# Patient Record
Sex: MALE | Race: WHITE | NOT HISPANIC OR LATINO
[De-identification: names, ages, dates, MRNs, and addresses within clinical notes are randomized per-mention and may not be internally consistent; named-entity substitution may affect disease eponyms.]

---

## 2021-08-30 ENCOUNTER — TRANSCRIPTION ENCOUNTER (OUTPATIENT)
Age: 56
End: 2021-08-30

## 2021-08-30 ENCOUNTER — FORM ENCOUNTER (OUTPATIENT)
Age: 56
End: 2021-08-30

## 2021-08-31 ENCOUNTER — TRANSCRIPTION ENCOUNTER (OUTPATIENT)
Age: 56
End: 2021-08-31

## 2021-09-02 ENCOUNTER — APPOINTMENT (OUTPATIENT)
Dept: DISASTER EMERGENCY | Facility: HOSPITAL | Age: 56
End: 2021-09-02

## 2021-09-02 ENCOUNTER — OUTPATIENT (OUTPATIENT)
Dept: INPATIENT UNIT | Facility: HOSPITAL | Age: 56
LOS: 1 days | End: 2021-09-02
Payer: COMMERCIAL

## 2021-09-02 VITALS
DIASTOLIC BLOOD PRESSURE: 86 MMHG | RESPIRATION RATE: 20 BRPM | HEART RATE: 64 BPM | TEMPERATURE: 98 F | OXYGEN SATURATION: 94 % | HEIGHT: 70 IN | WEIGHT: 309.97 LBS | SYSTOLIC BLOOD PRESSURE: 137 MMHG

## 2021-09-02 VITALS
HEART RATE: 57 BPM | TEMPERATURE: 98 F | OXYGEN SATURATION: 95 % | SYSTOLIC BLOOD PRESSURE: 110 MMHG | DIASTOLIC BLOOD PRESSURE: 68 MMHG | RESPIRATION RATE: 20 BRPM

## 2021-09-02 DIAGNOSIS — U07.1 COVID-19: ICD-10-CM

## 2021-09-02 PROCEDURE — M0243: CPT

## 2021-09-02 RX ORDER — SODIUM CHLORIDE 9 MG/ML
250 INJECTION INTRAMUSCULAR; INTRAVENOUS; SUBCUTANEOUS
Refills: 0 | Status: COMPLETED | OUTPATIENT
Start: 2021-09-02 | End: 2021-09-02

## 2021-09-02 RX ADMIN — SODIUM CHLORIDE 310 MILLILITER(S): 9 INJECTION INTRAMUSCULAR; INTRAVENOUS; SUBCUTANEOUS at 11:16

## 2021-09-02 NOTE — CHART NOTE - NSCHARTNOTEFT_GEN_A_CORE
CC: Monoclonal Antibody Infusion/COVID 19 Positive      History: Patient presents for infusion of monoclonal antibody infusion. Patient has been screened and was deemed to be a candidate.    Symptoms/ Criteria: Fever, cough, sore throat, body ache, nasal congestion, loss of smell    Inclusion Criteria: BMI> 25  Positive Test Date: 8/30/21  Date of Symptom Onset: 8/30/21        PMHx:  Infection due to severe acute respiratory syndrome coronavirus 2 (SARS-CoV-2)          T(C): 36.6 (09-02-21 @ 12:35), Max: 36.8 (09-02-21 @ 10:15)  HR: 57 (09-02-21 @ 12:35) (57 - 64)  BP: 110/68 (09-02-21 @ 12:35) (110/68 - 137/86)  RR: 20 (09-02-21 @ 12:35) (20 - 20)  SpO2: 95% (09-02-21 @ 12:35) (93% - 95%)      PE:   Appearance: NAD	  HEENT:   Normal oral mucosa.   Lymphatic: No lymphadenopathy  Cardiovascular: Normal S1 S2, No JVD, No murmurs, No edema  Respiratory: Lungs clear to auscultation	  Gastrointestinal:  Soft, Non-tender. No guarding   Skin: warm and dry  Neurologic: Non-focal  Extremities: Normal range of motion.     ASSESSMENT:  Pt is a 55y year old Male with PMH Covid +  referred by Ana Brewster to the infusion center for Monoclonal antibody infusion (Regeneron).        PLAN:  - infusion procedure explained to patient   - Consent for monoclonal antibody infusion obtained   - Risk & benefits discussed/all questions answered  - infuse Regeneron over twenty-one minutes   - will observe patient for one hour post infusion  and then if stable discharge home with oupt follow up as planned by PMD.    POST INFUSION ASSESSMENT:   DISCHARGE at approximately  12:40  hours    - Patient tolerated infusion well denies complaints of chest pain/SOB/dizziness/ palps  - VSS for discharge home  - D/C instructions given/ fact sheet included.  - Patient to follow-up with PCP as needed.

## 2021-09-04 ENCOUNTER — TRANSCRIPTION ENCOUNTER (OUTPATIENT)
Age: 56
End: 2021-09-04

## 2021-09-04 PROBLEM — Z00.00 ENCOUNTER FOR PREVENTIVE HEALTH EXAMINATION: Status: ACTIVE | Noted: 2021-09-04

## 2022-06-07 ENCOUNTER — APPOINTMENT (OUTPATIENT)
Dept: ORTHOPEDIC SURGERY | Facility: CLINIC | Age: 57
End: 2022-06-07
Payer: OTHER MISCELLANEOUS

## 2022-06-07 VITALS — HEIGHT: 69 IN | WEIGHT: 315 LBS | BODY MASS INDEX: 46.65 KG/M2

## 2022-06-07 DIAGNOSIS — M51.36 OTHER INTERVERTEBRAL DISC DEGENERATION, LUMBAR REGION: ICD-10-CM

## 2022-06-07 DIAGNOSIS — Z78.9 OTHER SPECIFIED HEALTH STATUS: ICD-10-CM

## 2022-06-07 DIAGNOSIS — E07.9 DISORDER OF THYROID, UNSPECIFIED: ICD-10-CM

## 2022-06-07 DIAGNOSIS — I10 ESSENTIAL (PRIMARY) HYPERTENSION: ICD-10-CM

## 2022-06-07 PROCEDURE — 99072 ADDL SUPL MATRL&STAF TM PHE: CPT

## 2022-06-07 PROCEDURE — 99214 OFFICE O/P EST MOD 30 MIN: CPT

## 2022-06-07 RX ORDER — LEVOTHYROXINE SODIUM 0.07 MG/1
75 TABLET ORAL
Refills: 0 | Status: ACTIVE | COMMUNITY

## 2022-06-07 RX ORDER — PANTOPRAZOLE 40 MG/1
40 TABLET, DELAYED RELEASE ORAL
Qty: 90 | Refills: 0 | Status: ACTIVE | COMMUNITY
Start: 2022-01-14

## 2022-06-07 RX ORDER — PANTOPRAZOLE SODIUM 40 MG/1
40 TABLET, DELAYED RELEASE ORAL
Refills: 0 | Status: ACTIVE | COMMUNITY

## 2022-06-07 RX ORDER — LEVOTHYROXINE SODIUM 0.07 MG/1
75 TABLET ORAL
Qty: 90 | Refills: 0 | Status: ACTIVE | COMMUNITY
Start: 2022-03-08

## 2022-06-07 RX ORDER — METOPROLOL SUCCINATE 25 MG/1
25 TABLET, EXTENDED RELEASE ORAL
Qty: 90 | Refills: 0 | Status: ACTIVE | COMMUNITY
Start: 2022-04-04

## 2022-06-07 RX ORDER — METOPROLOL SUCCINATE 25 MG/1
25 TABLET, EXTENDED RELEASE ORAL
Refills: 0 | Status: ACTIVE | COMMUNITY

## 2022-06-07 RX ORDER — GABAPENTIN 400 MG/1
400 CAPSULE ORAL
Qty: 90 | Refills: 0 | Status: ACTIVE | COMMUNITY
Start: 2022-03-08

## 2022-06-07 RX ORDER — NAPROXEN 500 MG/1
500 TABLET ORAL
Qty: 180 | Refills: 0 | Status: ACTIVE | COMMUNITY
Start: 2022-05-03

## 2022-06-07 RX ORDER — VALSARTAN AND HYDROCHLOROTHIAZIDE 160; 12.5 MG/1; MG/1
160-12.5 TABLET, FILM COATED ORAL
Refills: 0 | Status: ACTIVE | COMMUNITY

## 2022-06-07 RX ORDER — VALSARTAN AND HYDROCHLOROTHIAZIDE 160; 12.5 MG/1; MG/1
160-12.5 TABLET, FILM COATED ORAL
Qty: 90 | Refills: 0 | Status: ACTIVE | COMMUNITY
Start: 2022-01-14

## 2022-06-07 RX ORDER — AMOXICILLIN AND CLAVULANATE POTASSIUM 875; 125 MG/1; MG/1
875-125 TABLET, COATED ORAL
Qty: 20 | Refills: 0 | Status: ACTIVE | COMMUNITY
Start: 2022-03-31

## 2022-06-07 NOTE — ASSESSMENT
[FreeTextEntry1] : Patient requesting  Bilateral Orthovisc Injections  Patient is actively participating in  Weight loss program.

## 2022-06-07 NOTE — REASON FOR VISIT
[FreeTextEntry2] : HERE TODAY FOR BILATERAL KNEE PAIN, C/O RIGHT KNEE IS WORSE NOW.  HAS HAD MULTIPLE ORTHOVISC INJECTION SERIES WHICH HELP HIS PAIN.  HE IS NOT READY FOR TKA SURGERY D/T WORK SCHEDULE. USING NAPROXEN FOR PAIN.  PAIN 7/10\par HE WOULD LIKE ANOTHER ROUND OF ORTHOVISC INJECTIONS

## 2022-06-07 NOTE — PHYSICAL EXAM
[Left] : left knee [Right] : right knee [5___] : hamstring 5[unfilled]/5 [] : extensor lag [de-identified] : WITH PAIN  [de-identified] : Luis Manuel [TWNoteComboBox7] : flexion 100 degrees [de-identified] : extension 3 degrees

## 2022-07-07 ENCOUNTER — NON-APPOINTMENT (OUTPATIENT)
Age: 57
End: 2022-07-07

## 2022-09-02 ENCOUNTER — APPOINTMENT (OUTPATIENT)
Dept: ORTHOPEDIC SURGERY | Facility: CLINIC | Age: 57
End: 2022-09-02

## 2022-09-02 VITALS — HEIGHT: 69 IN | BODY MASS INDEX: 40.73 KG/M2 | WEIGHT: 275 LBS

## 2022-09-02 VITALS — HEIGHT: 69 IN | BODY MASS INDEX: 46.65 KG/M2 | WEIGHT: 315 LBS

## 2022-09-02 VITALS — BODY MASS INDEX: 46.65 KG/M2 | WEIGHT: 315 LBS | HEIGHT: 69 IN

## 2022-09-02 PROCEDURE — 99072 ADDL SUPL MATRL&STAF TM PHE: CPT | Mod: ACP

## 2022-09-02 PROCEDURE — 20610 DRAIN/INJ JOINT/BURSA W/O US: CPT | Mod: ACP

## 2022-09-02 NOTE — PROCEDURE
[Large Joint Injection] : Large joint injection [Bilateral] : bilaterally of the [Knee] : knee [Pain] : pain [Inflammation] : inflammation [Betadine] : betadine [Orthovisc] : Orthovisc [#1] : series #1 [Call if redness, pain or fever occur] : call if redness, pain or fever occur [Apply ice for 15min out of every hour for the next 12-24 hours as tolerated] : apply ice for 15 minutes out of every hour for the next 12-24 hours as tolerated [Patient was advised to rest the joint(s) for ____ days] : patient was advised to rest the joint(s) for [unfilled] days [Previous OTC use and PT nontherapeutic] : patient has tried OTC's including aspirin, Ibuprofen, Aleve, etc or prescription NSAIDS, and/or exercises at home and/or physical therapy without satisfactory response [Patient had decreased mobility in the joint] : patient had decreased mobility in the joint [de-identified] : 30mg/2ml

## 2022-09-02 NOTE — PHYSICAL EXAM
[Left] : left knee [Right] : right knee [5___] : hamstring 5[unfilled]/5 [] : patient ambulates without assistive device [de-identified] : WITH PAIN  [de-identified] : Luis Manuel [TWNoteComboBox7] : flexion 100 degrees [de-identified] : extension 3 degrees

## 2022-09-02 NOTE — REASON FOR VISIT
[FreeTextEntry2] : here today for orthovisc injections bilateral WC injury 4/10/12\par pain 8/10.  has been waiting for autho from  since june/22.

## 2022-09-09 ENCOUNTER — APPOINTMENT (OUTPATIENT)
Dept: ORTHOPEDIC SURGERY | Facility: CLINIC | Age: 57
End: 2022-09-09

## 2022-09-09 VITALS — HEIGHT: 69 IN | BODY MASS INDEX: 46.65 KG/M2 | WEIGHT: 315 LBS

## 2022-09-09 PROCEDURE — 99072 ADDL SUPL MATRL&STAF TM PHE: CPT | Mod: ACP

## 2022-09-09 PROCEDURE — 20610 DRAIN/INJ JOINT/BURSA W/O US: CPT | Mod: ACP,RT

## 2022-09-09 NOTE — PROCEDURE
[Large Joint Injection] : Large joint injection [Bilateral] : bilaterally of the [Knee] : knee [Pain] : pain [Inflammation] : inflammation [Betadine] : betadine [Orthovisc] : Orthovisc [#2] : series #2 [Call if redness, pain or fever occur] : call if redness, pain or fever occur [Apply ice for 15min out of every hour for the next 12-24 hours as tolerated] : apply ice for 15 minutes out of every hour for the next 12-24 hours as tolerated [Patient was advised to rest the joint(s) for ____ days] : patient was advised to rest the joint(s) for [unfilled] days [Previous OTC use and PT nontherapeutic] : patient has tried OTC's including aspirin, Ibuprofen, Aleve, etc or prescription NSAIDS, and/or exercises at home and/or physical therapy without satisfactory response [Patient had decreased mobility in the joint] : patient had decreased mobility in the joint [de-identified] : 30mg/2ml   bilateral

## 2022-09-09 NOTE — PHYSICAL EXAM
[Left] : left knee [Right] : right knee [5___] : hamstring 5[unfilled]/5 [] : patient ambulates without assistive device [de-identified] : WITH PAIN  [de-identified] : Luis Manuel [TWNoteComboBox7] : flexion 100 degrees [de-identified] : extension 3 degrees

## 2022-09-16 ENCOUNTER — APPOINTMENT (OUTPATIENT)
Dept: ORTHOPEDIC SURGERY | Facility: CLINIC | Age: 57
End: 2022-09-16

## 2022-09-16 VITALS — WEIGHT: 315 LBS | HEIGHT: 69 IN | BODY MASS INDEX: 46.65 KG/M2

## 2022-09-16 PROCEDURE — 99072 ADDL SUPL MATRL&STAF TM PHE: CPT | Mod: ACP

## 2022-09-16 PROCEDURE — 20610 DRAIN/INJ JOINT/BURSA W/O US: CPT | Mod: ACP,RT

## 2022-09-16 NOTE — PHYSICAL EXAM
[Left] : left knee [Right] : right knee [5___] : hamstring 5[unfilled]/5 [] : patient ambulates without assistive device [de-identified] : WITH PAIN  [de-identified] : Luis Manuel [TWNoteComboBox7] : flexion 100 degrees [de-identified] : extension 3 degrees

## 2022-09-16 NOTE — REASON FOR VISIT
[FreeTextEntry2] : here today for orthovisc inj bilateral. #3  WC injury 4/10/12.  pain 4/10.  states injections are helping knee pain

## 2022-09-16 NOTE — PROCEDURE
[Large Joint Injection] : Large joint injection [Bilateral] : bilaterally of the [Knee] : knee [Pain] : pain [Inflammation] : inflammation [Betadine] : betadine [Orthovisc] : Orthovisc [#3] : series #3 [Call if redness, pain or fever occur] : call if redness, pain or fever occur [Apply ice for 15min out of every hour for the next 12-24 hours as tolerated] : apply ice for 15 minutes out of every hour for the next 12-24 hours as tolerated [Patient was advised to rest the joint(s) for ____ days] : patient was advised to rest the joint(s) for [unfilled] days [Previous OTC use and PT nontherapeutic] : patient has tried OTC's including aspirin, Ibuprofen, Aleve, etc or prescription NSAIDS, and/or exercises at home and/or physical therapy without satisfactory response [Patient had decreased mobility in the joint] : patient had decreased mobility in the joint [de-identified] : 30mg/2ml   bilateral

## 2022-09-16 NOTE — DISCUSSION/SUMMARY
[de-identified] :  wants to Book  TKA  \par His BMI 49  discussed  weight loss and complications   of surgey\par  needs to lose 70#s to get to BMI of $) \par  Suggested  Optiva   he will look into and  return to this office\par \par  also need  Back Sx and Might Book that sx  First

## 2022-11-22 ENCOUNTER — APPOINTMENT (OUTPATIENT)
Dept: ORTHOPEDIC SURGERY | Facility: CLINIC | Age: 57
End: 2022-11-22

## 2022-11-22 VITALS — BODY MASS INDEX: 46.65 KG/M2 | HEIGHT: 69 IN | WEIGHT: 315 LBS

## 2022-11-22 DIAGNOSIS — Z87.39 PERSONAL HISTORY OF OTHER DISEASES OF THE MUSCULOSKELETAL SYSTEM AND CONNECTIVE TISSUE: ICD-10-CM

## 2022-11-22 DIAGNOSIS — M48.00 SPINAL STENOSIS, SITE UNSPECIFIED: ICD-10-CM

## 2022-11-22 PROCEDURE — 99213 OFFICE O/P EST LOW 20 MIN: CPT

## 2022-11-22 PROCEDURE — 99072 ADDL SUPL MATRL&STAF TM PHE: CPT

## 2022-11-22 NOTE — DISCUSSION/SUMMARY
[de-identified] : Lengthy discussion regarding options was had with the patient. Nonsurgical options including but not limited to cortisone,viscosupplementation, anti-inflammatory medications, activity modification,  non impact exercise /maintaining a healthy BMI, bracing, and icing were reviewed. Surgical options including but not limited to arthroscopy, and joint replacement were discussed as was risks, benefits and alternatives. All questions were answered. \par Discussed importance of non-impact exercise and muscle stretching before and after exercise. Stretching exercises shown to the patient as explained the importance of Range of Motion before strength. \par Discussed weight loss and diet to lower BMI to proceed with surgery.\par Recommended addressing lumbar spine and drop foot first and following up with spine specialist (currently is Dr Rodriguez) prior than operating on knees

## 2022-11-22 NOTE — REASON FOR VISIT
[FreeTextEntry2] : here today for f/u bilateral knees WC 4/10/12.  states injections from 9/2022 didn’t help as much this last time.   here to discuss surgery on knees.  pain 8/10.  patient states that he started weight loss program and has lost 22# already.  patient has had multiple years of gel injections and PT which no longer help.

## 2022-11-22 NOTE — PHYSICAL EXAM
[Left] : left knee [Right] : right knee [5___] : hamstring 5[unfilled]/5 [] : no deformities of patellar tendon [de-identified] : WITH PAIN  [de-identified] : Marjan [TWNoteComboBox7] : flexion 100 degrees [de-identified] : extension 3 degrees

## 2023-03-23 ENCOUNTER — APPOINTMENT (OUTPATIENT)
Dept: ORTHOPEDIC SURGERY | Facility: CLINIC | Age: 58
End: 2023-03-23
Payer: OTHER MISCELLANEOUS

## 2023-03-23 VITALS — WEIGHT: 315 LBS | BODY MASS INDEX: 46.65 KG/M2 | HEIGHT: 69 IN

## 2023-03-23 PROCEDURE — 99213 OFFICE O/P EST LOW 20 MIN: CPT

## 2023-03-23 NOTE — DISCUSSION/SUMMARY
[de-identified] : Lengthy discussion regarding options was had with the patient. Nonsurgical options including but not limited to cortisone,viscosupplementation, anti-inflammatory medications, activity modification,  non impact exercise /maintaining a healthy BMI, bracing, and icing were reviewed. Surgical options including but not limited to arthroscopy, and joint replacement were discussed as was risks, benefits and alternatives. All questions were answered. \par Discussed importance of non-impact exercise and muscle stretching before and after exercise. Stretching exercises shown to the patient as explained the importance of Range of Motion before strength. \par Discussed weight loss and need for TKA\par Requested authorization for HA injections in right knee\par

## 2023-03-23 NOTE — PHYSICAL EXAM
[Left] : left knee [Right] : right knee [5___] : hamstring 5[unfilled]/5 [] : no deformities of patellar tendon [de-identified] : WITH PAIN  [de-identified] : Waddle\par difficulty ambulating  [TWNoteComboBox7] : flexion 100 degrees [de-identified] : extension 3 degrees

## 2023-03-23 NOTE — REASON FOR VISIT
[FreeTextEntry2] : here today for f/u bilateral knees WC 4/10/12. he wants to discuss injections and surgery. pain 7/10. He reports that he is working on weight loss and that he lost ~30 pounds so far. Takes naproxen for knees and gabapentin for back pain. As for his lumbar spine, he saw Dr Rodriguez who said they are not doing any tx at this time.  patient wants to have gel injections again.  patient states the injections help him a lot. he is not ready for surgery yet.  he is still trying to lose weight

## 2023-04-14 ENCOUNTER — APPOINTMENT (OUTPATIENT)
Dept: ORTHOPEDIC SURGERY | Facility: CLINIC | Age: 58
End: 2023-04-14
Payer: OTHER MISCELLANEOUS

## 2023-04-14 VITALS — WEIGHT: 315 LBS | HEIGHT: 69 IN | BODY MASS INDEX: 46.65 KG/M2

## 2023-04-14 PROCEDURE — 20610 DRAIN/INJ JOINT/BURSA W/O US: CPT | Mod: NC

## 2023-04-14 NOTE — PROCEDURE
[Large Joint Injection] : Large joint injection [Bilateral] : bilaterally of the [Knee] : knee [Pain] : pain [Inflammation] : inflammation [X-ray evidence of Osteoarthritis on this or prior visit] : x-ray evidence of Osteoarthritis on this or prior visit [Betadine] : betadine [Ethyl Chloride sprayed topically] : ethyl chloride sprayed topically [Sterile technique used] : sterile technique used [Orthovisc (30mg)] : 30mg of Orthovisc [#1] : series #1 [] : Patient tolerated procedure well [Call if redness, pain or fever occur] : call if redness, pain or fever occur [Apply ice for 15min out of every hour for the next 12-24 hours as tolerated] : apply ice for 15 minutes out of every hour for the next 12-24 hours as tolerated [Previous OTC use and PT nontherapeutic] : patient has tried OTC's including aspirin, Ibuprofen, Aleve, etc or prescription NSAIDS, and/or exercises at home and/or physical therapy without satisfactory response [Patient had decreased mobility in the joint] : patient had decreased mobility in the joint [Risks, benefits, alternatives discussed / Verbal consent obtained] : the risks benefits, and alternatives have been discussed, and verbal consent was obtained

## 2023-04-14 NOTE — PHYSICAL EXAM
[Left] : left knee [Right] : right knee [5___] : hamstring 5[unfilled]/5 [] : patient ambulates without assistive device [de-identified] : WITH PAIN  [de-identified] : Waddle\par difficulty ambulating  [TWNoteComboBox7] : flexion 100 degrees [de-identified] : extension 3 degrees

## 2023-04-14 NOTE — DISCUSSION/SUMMARY
[de-identified] : pt received first series of orthovisc injections in bilateral knees in office today. pt tolerated procedure well and was advised to ice knees. follow up in one week for second series. \par \par Discussed importance of non-impact exercise and muscle stretching before and after exercise. \par \par Discussed weight loss and need to be lower BMI to proceed with surgery

## 2023-04-21 ENCOUNTER — APPOINTMENT (OUTPATIENT)
Dept: ORTHOPEDIC SURGERY | Facility: CLINIC | Age: 58
End: 2023-04-21
Payer: OTHER MISCELLANEOUS

## 2023-04-21 VITALS — HEIGHT: 69 IN | WEIGHT: 315 LBS | BODY MASS INDEX: 46.65 KG/M2

## 2023-04-21 PROCEDURE — 20610 DRAIN/INJ JOINT/BURSA W/O US: CPT | Mod: 50

## 2023-04-21 NOTE — PHYSICAL EXAM
[Left] : left knee [Right] : right knee [5___] : hamstring 5[unfilled]/5 [] : no deformities of patellar tendon [de-identified] : WITH PAIN  [de-identified] : Waddle\par difficulty ambulating  [TWNoteComboBox7] : flexion 100 degrees [de-identified] : extension 3 degrees

## 2023-04-21 NOTE — PROCEDURE
[Large Joint Injection] : Large joint injection [Bilateral] : bilaterally of the [Pain] : pain [Knee] : knee [Inflammation] : inflammation [X-ray evidence of Osteoarthritis on this or prior visit] : x-ray evidence of Osteoarthritis on this or prior visit [Betadine] : betadine [Ethyl Chloride sprayed topically] : ethyl chloride sprayed topically [Sterile technique used] : sterile technique used [Orthovisc (30mg)] : 30mg of Orthovisc [#2] : series #2 [] : Patient tolerated procedure well [Call if redness, pain or fever occur] : call if redness, pain or fever occur [Apply ice for 15min out of every hour for the next 12-24 hours as tolerated] : apply ice for 15 minutes out of every hour for the next 12-24 hours as tolerated [Previous OTC use and PT nontherapeutic] : patient has tried OTC's including aspirin, Ibuprofen, Aleve, etc or prescription NSAIDS, and/or exercises at home and/or physical therapy without satisfactory response [Patient had decreased mobility in the joint] : patient had decreased mobility in the joint [Risks, benefits, alternatives discussed / Verbal consent obtained] : the risks benefits, and alternatives have been discussed, and verbal consent was obtained

## 2023-04-21 NOTE — DISCUSSION/SUMMARY
[de-identified] : pt received second series of orthovisc injections in bilateral knees in office today. pt tolerated procedure well and was advised to ice knees. follow up in one week for third series. \par

## 2023-04-28 ENCOUNTER — APPOINTMENT (OUTPATIENT)
Dept: ORTHOPEDIC SURGERY | Facility: CLINIC | Age: 58
End: 2023-04-28
Payer: OTHER MISCELLANEOUS

## 2023-04-28 DIAGNOSIS — M17.0 BILATERAL PRIMARY OSTEOARTHRITIS OF KNEE: ICD-10-CM

## 2023-04-28 PROCEDURE — 20610 DRAIN/INJ JOINT/BURSA W/O US: CPT | Mod: 50

## 2023-04-28 NOTE — REASON FOR VISIT
[FreeTextEntry2] : here today for orthovisc #3 bilateral knees. pt states he feels some relief from the first and second injections

## 2023-04-28 NOTE — PHYSICAL EXAM
[Left] : left knee [Right] : right knee [5___] : hamstring 5[unfilled]/5 [] : no deformities of patellar tendon [de-identified] : Waddle\par difficulty ambulating  [de-identified] : WITH PAIN  [de-identified] : extension 3 degrees [TWNoteComboBox7] : flexion 100 degrees

## 2023-04-28 NOTE — DISCUSSION/SUMMARY
[de-identified] : pt received third series of orthovisc injections in bilateral knees in office today. pt tolerated procedure well and was advised to ice knees. follow up prn

## 2023-04-28 NOTE — PROCEDURE
[Large Joint Injection] : Large joint injection [Bilateral] : bilaterally of the [Knee] : knee [Pain] : pain [Inflammation] : inflammation [X-ray evidence of Osteoarthritis on this or prior visit] : x-ray evidence of Osteoarthritis on this or prior visit [Betadine] : betadine [Ethyl Chloride sprayed topically] : ethyl chloride sprayed topically [Sterile technique used] : sterile technique used [Orthovisc (30mg)] : 30mg of Orthovisc [] : Patient tolerated procedure well [Call if redness, pain or fever occur] : call if redness, pain or fever occur [Apply ice for 15min out of every hour for the next 12-24 hours as tolerated] : apply ice for 15 minutes out of every hour for the next 12-24 hours as tolerated [Previous OTC use and PT nontherapeutic] : patient has tried OTC's including aspirin, Ibuprofen, Aleve, etc or prescription NSAIDS, and/or exercises at home and/or physical therapy without satisfactory response [Patient had decreased mobility in the joint] : patient had decreased mobility in the joint [Risks, benefits, alternatives discussed / Verbal consent obtained] : the risks benefits, and alternatives have been discussed, and verbal consent was obtained [#3] : series #3

## 2023-08-08 ENCOUNTER — APPOINTMENT (OUTPATIENT)
Dept: ORTHOPEDIC SURGERY | Facility: CLINIC | Age: 58
End: 2023-08-08
Payer: OTHER MISCELLANEOUS

## 2023-08-08 VITALS — HEIGHT: 69 IN | BODY MASS INDEX: 46.65 KG/M2 | WEIGHT: 315 LBS

## 2023-08-08 PROCEDURE — 99214 OFFICE O/P EST MOD 30 MIN: CPT

## 2023-08-08 PROCEDURE — 73564 X-RAY EXAM KNEE 4 OR MORE: CPT | Mod: 50

## 2023-08-08 NOTE — REASON FOR VISIT
[FreeTextEntry2] : here for f/u bilateral knee pain  WC 4/12/12.  states the gel injections in april helped for about a month and states pain is coming back. he has had multiple series of injections over the years. here today to discuss TKA surgery.  pain 8/10.  patient is working.

## 2023-08-08 NOTE — DISCUSSION/SUMMARY
[de-identified] : Discussed all Nonoperative treatment options including steroid injection, gel injections and PT. Due to Medial bone on bone nonoperative options are not recommended. TKA of the left knee is recommended as surgical treatment.    Lengthy discussion regarding options was had with the patient. Nonsurgical options including but not limited to cortisone, viscosupplementation, anti-inflammatory medications, activity modification, non-impact exercise, maintaining a healthy BMI, bracing, and icing were reviewed. Surgical options including but not limited to arthroscopy, and joint replacement were discussed as was risks, benefits and alternatives. All questions were answered.   I spent time going in detail the problem and the associated risks/benefits of surgery. detailed complications but not limited to: of nerve injury, non union, repeat fx, dvt/pe postop, instability,transfusion, infection, NVA injury, stiffness, leg length discrepancy, inability to ambulate & death. discussed implant and model shown to patient. answered all patient questions. patient understands procedure and postop directions. Patient has failed conservative treatment and  PT is contraindicated at this time   Patient reports no decreased sensation, and is seeing neurology. Patient has been taking Gabapentin and Naproxen for both knee and back sxs with little to no relief. Patient has previously gone to PT with no success. Patient has also been using gel injections for years with minimal relief at this time.  Long discussion about healthy weight management was had. Patient reports they have been loosing weight at this time. Patients physical activity is limited due to significant b/l Knee oa.  Patient will continue Weight Loss at this time.  Patients ROM is significantly limited due to BT KNEE OA, rec BT TOTAL KNEE ARTHROPLASTY   Plan at this time is for Left knee TOTAL KNEE ARTHROPLASTY followed by Right TOTAL KNEE ARTHROPLASTY  3 months later.  Will submit for auth at this time.

## 2023-08-08 NOTE — PHYSICAL EXAM
[Right] : right knee [Left] : left knee [] : patient ambulates without assistive device [Bilateral] : knee bilaterally [AP] : anteroposterior [Lateral] : lateral [[place]] : skyline [AP Standing] : anteroposterior standing [de-identified] : Patient is extremely antalgic. Flexed knee with stiff back.  [de-identified] : Little to no dorsiflexion both ankle and toe  [FreeTextEntry9] : joint space narrowing. Sclerotic Bone VARUS Knee bone on bone MEDIAL . tri comp Osteophyte formation. No fractures seen  [TWNoteComboBox7] : flexion 90 degrees [de-identified] : extension 5 degrees

## 2023-10-25 ENCOUNTER — APPOINTMENT (OUTPATIENT)
Dept: ORTHOPEDIC SURGERY | Facility: HOSPITAL | Age: 58
End: 2023-10-25
Payer: OTHER MISCELLANEOUS

## 2023-10-25 PROCEDURE — 27447 TOTAL KNEE ARTHROPLASTY: CPT | Mod: AS,LT

## 2023-10-25 PROCEDURE — 20985 CPTR-ASST DIR MS PX: CPT

## 2023-10-25 PROCEDURE — 20610 DRAIN/INJ JOINT/BURSA W/O US: CPT | Mod: 59,LT

## 2023-10-25 PROCEDURE — 27447 TOTAL KNEE ARTHROPLASTY: CPT | Mod: LT

## 2023-10-26 RX ORDER — CELECOXIB 200 MG/1
200 CAPSULE ORAL TWICE DAILY
Qty: 28 | Refills: 0 | Status: ACTIVE | COMMUNITY
Start: 2023-10-26 | End: 1900-01-01

## 2023-11-10 ENCOUNTER — APPOINTMENT (OUTPATIENT)
Dept: ORTHOPEDIC SURGERY | Facility: CLINIC | Age: 58
End: 2023-11-10
Payer: OTHER MISCELLANEOUS

## 2023-11-10 DIAGNOSIS — Z47.1 AFTERCARE FOLLOWING JOINT REPLACEMENT SURGERY: ICD-10-CM

## 2023-11-10 DIAGNOSIS — Z96.652 AFTERCARE FOLLOWING JOINT REPLACEMENT SURGERY: ICD-10-CM

## 2023-11-10 PROCEDURE — 99024 POSTOP FOLLOW-UP VISIT: CPT

## 2023-11-29 ENCOUNTER — NON-APPOINTMENT (OUTPATIENT)
Age: 58
End: 2023-11-29

## 2023-12-12 ENCOUNTER — APPOINTMENT (OUTPATIENT)
Dept: ORTHOPEDIC SURGERY | Facility: CLINIC | Age: 58
End: 2023-12-12
Payer: OTHER MISCELLANEOUS

## 2023-12-12 VITALS — HEIGHT: 69 IN | BODY MASS INDEX: 46.65 KG/M2 | WEIGHT: 315 LBS

## 2023-12-12 PROCEDURE — 73562 X-RAY EXAM OF KNEE 3: CPT | Mod: LT

## 2023-12-12 PROCEDURE — 99024 POSTOP FOLLOW-UP VISIT: CPT

## 2024-02-02 RX ORDER — AMOXICILLIN 500 MG/1
500 TABLET, FILM COATED ORAL
Qty: 12 | Refills: 0 | Status: ACTIVE | COMMUNITY
Start: 2024-02-02 | End: 1900-01-01

## 2024-02-06 ENCOUNTER — APPOINTMENT (OUTPATIENT)
Dept: ORTHOPEDIC SURGERY | Facility: CLINIC | Age: 59
End: 2024-02-06
Payer: OTHER MISCELLANEOUS

## 2024-02-06 VITALS — WEIGHT: 315 LBS | BODY MASS INDEX: 46.65 KG/M2 | HEIGHT: 69 IN

## 2024-02-06 DIAGNOSIS — M21.379 FOOT DROP, UNSPECIFIED FOOT: ICD-10-CM

## 2024-02-06 PROCEDURE — 73562 X-RAY EXAM OF KNEE 3: CPT | Mod: LT

## 2024-02-06 PROCEDURE — 99213 OFFICE O/P EST LOW 20 MIN: CPT

## 2024-02-06 NOTE — REASON FOR VISIT
[FreeTextEntry2] : WC injury 4/10/12 bilateral knee . here today for left TKA 10/25/23.  doing well, happy with knee. no assistive devices.  patient would also like to discuss scheduling right TKA

## 2024-02-06 NOTE — PHYSICAL EXAM
[Left] : left knee [NL (0)] : extension 0 degrees [5___] : hamstring 5[unfilled]/5 [] : patient ambulates without assistive device [de-identified] : patella click  [de-identified] : waddling gait  [TWNoteComboBox7] : flexion 110 degrees

## 2024-02-06 NOTE — DISCUSSION/SUMMARY
[de-identified] : Patient is progressing well at this time. Upon inspection of the incision, the area was clean, dry and there were no signs of infection.    Patient was provided with Rx today to begin PT. Discussion was had with the patient regarding the importance of working on ROM at this time both with PT and at home using HEP shown today. Discussed the importance of gaining and maintaining good ROM and its involvement in daily life.   f/u 9 months

## 2024-04-25 ENCOUNTER — APPOINTMENT (OUTPATIENT)
Dept: ORTHOPEDIC SURGERY | Facility: CLINIC | Age: 59
End: 2024-04-25
Payer: OTHER MISCELLANEOUS

## 2024-04-25 VITALS — HEIGHT: 69 IN | BODY MASS INDEX: 46.65 KG/M2 | WEIGHT: 315 LBS

## 2024-04-25 DIAGNOSIS — Z96.652 PRESENCE OF LEFT ARTIFICIAL KNEE JOINT: ICD-10-CM

## 2024-04-25 DIAGNOSIS — M17.11 UNILATERAL PRIMARY OSTEOARTHRITIS, RIGHT KNEE: ICD-10-CM

## 2024-04-25 PROCEDURE — 99214 OFFICE O/P EST MOD 30 MIN: CPT

## 2024-04-25 NOTE — REASON FOR VISIT
[FreeTextEntry2] : here for f/u right knee pain.  WC injury 4/10/12.  we have wc autho for right TKA.  patient states he is now ready to schedule right TKA after recovering from left TKA 10/25/23.  is using naproxen currently for pain. feeling great with left knee right knee pain

## 2024-04-25 NOTE — PHYSICAL EXAM
[Left] : left knee [Right] : right knee [NL (0)] : extension 0 degrees [5___] : hamstring 5[unfilled]/5 [] : patient ambulates without assistive device [de-identified] : AFO left foot  [de-identified] : lateral PF crepitus  [TWNoteComboBox7] : flexion 100 degrees

## 2024-04-25 NOTE — DISCUSSION/SUMMARY
[de-identified] : patient has OA right knee  Lengthy discussion regarding options was had with the patient. Nonsurgical options including but not limited to cortisone, Visco supplementation, anti-inflammatory medications (both steroidal and non-steroidal), activity modification, non-impact exercise, maintaining a healthy BMI, bracing, and icing were reviewed. Surgical options including but not limited to arthroscopy, and joint replacement were discussed as was risks, benefits and alternatives.  The patient was advised of the diagnosis. The natural history of the pathology was explained in full to the patient in layman's terms. Several different treatment options were discussed and explained in full to the patient including the risks and benefits of both surgical and non-surgical treatments. All questions and concerns were answered.  I spent time going in detail the problem and the associated risks/benefits of right TKA surgery. Went into detailed discussion of complications including but not limited to, nerve injury, non-union, repeat fracture, DVT /PE /pe postop, instability, transfusion, infection, NVA injury, stiffness, leg length discrepancy, inability to ambulate & death. Discussed implant and model shown to patient. Patient had ample time to ask any questions, and at this time answered all patient questions, should anymore arise they were instructed to follow up. Patient expressed understanding of the proposed procedure and postop directions. Patient has failed non-surgical treatment and PT is contraindicated at this time.  todays plan is to procced with right TKA.

## 2024-09-12 ENCOUNTER — NON-APPOINTMENT (OUTPATIENT)
Age: 59
End: 2024-09-12

## 2024-09-17 ENCOUNTER — APPOINTMENT (OUTPATIENT)
Dept: ORTHOPEDIC SURGERY | Facility: CLINIC | Age: 59
End: 2024-09-17
Payer: OTHER MISCELLANEOUS

## 2024-09-17 VITALS — HEIGHT: 69 IN | BODY MASS INDEX: 46.65 KG/M2 | WEIGHT: 315 LBS

## 2024-09-17 DIAGNOSIS — M17.11 UNILATERAL PRIMARY OSTEOARTHRITIS, RIGHT KNEE: ICD-10-CM

## 2024-09-17 PROCEDURE — 99214 OFFICE O/P EST MOD 30 MIN: CPT

## 2024-09-17 NOTE — DISCUSSION/SUMMARY
[de-identified] : Lengthy discussion regarding options was had with the patient. Nonsurgical options including but not limited to cortisone, Visco supplementation, Prescription anti-inflammatory medications (both steroidal and non-steroidal), activity modification, non-impact exercise, maintaining a healthy BMI, bracing, and icing were reviewed. Surgical options including but not limited to arthroscopy, and joint replacement were discussed as was risks, benefits and alternatives of all the proposed treatments. Discussed also with the patient that they could also delay any immediate treatment options, and continue to observe and self care for the discussed problem. Discussed HEP as well as Rest, Ice and elevation. Patient had ample time to ask any questions about todays visit and the diagnosis, and all questions were answered. Patient understands the plan going forward.  The patient was advised of the diagnosis. The natural history of the pathology was explained in full to the patient in layman's terms. Several different treatment options were discussed and explained in full to the patient including the risks and benefits of both surgical and non-surgical treatments. All questions and concerns were answered.  As the patient has bone on bone OA in his right knee on prior XR and his ADLs are severely limited, we discussed a RTKA.  I spent time going in detail the problem and the associated risks/benefits of surgery. Went into detailed discussion of complications including but not limited to, nerve injury, non-union, repeat fracture, DVT /PE /pe postop, instability, transfusion, infection, NVA injury, stiffness, leg length discrepancy, inability to ambulate & death. Discussed implant and model shown to patient. Patient had ample time to ask any questions, and at this time answered all patient questions, should anymore arise they were instructed to follow up. Patient expressed understanding of the proposed procedure and postop directions.   Patient has failed conservative non-surgical treatment Prescription anti-inflammatory medications and PT is contraindicated at this time. PT is non indicated due to his deformity, due to pain limiting exercise and daily activities.  The plan at this time is to move forward with a RIGHT KNEE TOTAL ARTHROPLASTY.

## 2024-09-17 NOTE — REASON FOR VISIT
[FreeTextEntry2] : here for continued right knee pain WC 4/10/12.  c/o cant sleep, difficulty with ambulation, has had gel injections. using naproxen for pain relief.   has WC autho for Left TKA.

## 2024-10-04 ENCOUNTER — RESULT REVIEW (OUTPATIENT)
Age: 59
End: 2024-10-04

## 2024-10-23 ENCOUNTER — APPOINTMENT (OUTPATIENT)
Dept: ORTHOPEDIC SURGERY | Facility: HOSPITAL | Age: 59
End: 2024-10-23
Payer: OTHER MISCELLANEOUS

## 2024-10-23 PROCEDURE — 20985 CPTR-ASST DIR MS PX: CPT | Mod: AS

## 2024-10-23 PROCEDURE — 20985 CPTR-ASST DIR MS PX: CPT

## 2024-10-23 PROCEDURE — 27447 TOTAL KNEE ARTHROPLASTY: CPT | Mod: RT

## 2024-10-23 PROCEDURE — 27447 TOTAL KNEE ARTHROPLASTY: CPT | Mod: AS,RT

## 2024-10-25 RX ORDER — CEPHALEXIN 500 MG/1
500 CAPSULE ORAL 4 TIMES DAILY
Qty: 20 | Refills: 0 | Status: ACTIVE | COMMUNITY
Start: 2024-10-25 | End: 1900-01-01

## 2024-11-08 ENCOUNTER — APPOINTMENT (OUTPATIENT)
Dept: ORTHOPEDIC SURGERY | Facility: CLINIC | Age: 59
End: 2024-11-08
Payer: OTHER MISCELLANEOUS

## 2024-11-08 DIAGNOSIS — Z96.651 PRESENCE OF RIGHT ARTIFICIAL KNEE JOINT: ICD-10-CM

## 2024-11-08 PROCEDURE — 99024 POSTOP FOLLOW-UP VISIT: CPT

## 2024-11-08 RX ORDER — MELOXICAM 15 MG/1
15 TABLET ORAL DAILY
Qty: 30 | Refills: 0 | Status: ACTIVE | COMMUNITY
Start: 2024-11-08 | End: 1900-01-01

## 2024-12-02 ENCOUNTER — NON-APPOINTMENT (OUTPATIENT)
Age: 59
End: 2024-12-02

## 2024-12-10 ENCOUNTER — APPOINTMENT (OUTPATIENT)
Dept: ORTHOPEDIC SURGERY | Facility: CLINIC | Age: 59
End: 2024-12-10
Payer: OTHER MISCELLANEOUS

## 2024-12-10 DIAGNOSIS — G56.01 CARPAL TUNNEL SYNDROME, RIGHT UPPER LIMB: ICD-10-CM

## 2024-12-10 DIAGNOSIS — Z96.651 PRESENCE OF RIGHT ARTIFICIAL KNEE JOINT: ICD-10-CM

## 2024-12-10 PROCEDURE — 73562 X-RAY EXAM OF KNEE 3: CPT | Mod: RT

## 2024-12-10 PROCEDURE — 99024 POSTOP FOLLOW-UP VISIT: CPT

## 2025-01-06 ENCOUNTER — APPOINTMENT (OUTPATIENT)
Dept: ORTHOPEDIC SURGERY | Facility: CLINIC | Age: 60
End: 2025-01-06

## 2025-01-06 VITALS — WEIGHT: 315 LBS | HEIGHT: 69 IN | BODY MASS INDEX: 46.65 KG/M2

## 2025-01-21 ENCOUNTER — APPOINTMENT (OUTPATIENT)
Dept: ORTHOPEDIC SURGERY | Facility: CLINIC | Age: 60
End: 2025-01-21
Payer: OTHER MISCELLANEOUS

## 2025-01-21 DIAGNOSIS — Z96.651 PRESENCE OF RIGHT ARTIFICIAL KNEE JOINT: ICD-10-CM

## 2025-01-21 DIAGNOSIS — M17.11 UNILATERAL PRIMARY OSTEOARTHRITIS, RIGHT KNEE: ICD-10-CM

## 2025-01-21 PROCEDURE — 73562 X-RAY EXAM OF KNEE 3: CPT | Mod: RT

## 2025-01-21 PROCEDURE — 99024 POSTOP FOLLOW-UP VISIT: CPT
